# Patient Record
Sex: FEMALE | Race: BLACK OR AFRICAN AMERICAN | NOT HISPANIC OR LATINO | Employment: OTHER | URBAN - METROPOLITAN AREA
[De-identification: names, ages, dates, MRNs, and addresses within clinical notes are randomized per-mention and may not be internally consistent; named-entity substitution may affect disease eponyms.]

---

## 2020-01-29 DIAGNOSIS — M62.830 BACK SPASM: ICD-10-CM

## 2020-01-29 DIAGNOSIS — M54.50 LOW BACK PAIN: Primary | ICD-10-CM

## 2020-01-29 DIAGNOSIS — M51.36 DDD (DEGENERATIVE DISC DISEASE), LUMBAR: ICD-10-CM

## 2020-01-30 ENCOUNTER — CLINICAL SUPPORT (OUTPATIENT)
Dept: REHABILITATION | Facility: HOSPITAL | Age: 70
End: 2020-01-30
Payer: MEDICARE

## 2020-01-30 DIAGNOSIS — M79.605 PAIN IN BOTH LOWER EXTREMITIES: ICD-10-CM

## 2020-01-30 DIAGNOSIS — Z74.09 IMPAIRED FUNCTIONAL MOBILITY AND ACTIVITY TOLERANCE: ICD-10-CM

## 2020-01-30 DIAGNOSIS — G89.29 CHRONIC BILATERAL LOW BACK PAIN WITH BILATERAL SCIATICA: ICD-10-CM

## 2020-01-30 DIAGNOSIS — M54.41 CHRONIC BILATERAL LOW BACK PAIN WITH BILATERAL SCIATICA: ICD-10-CM

## 2020-01-30 DIAGNOSIS — M79.604 PAIN IN BOTH LOWER EXTREMITIES: ICD-10-CM

## 2020-01-30 DIAGNOSIS — M54.42 CHRONIC BILATERAL LOW BACK PAIN WITH BILATERAL SCIATICA: ICD-10-CM

## 2020-01-30 PROCEDURE — 97110 THERAPEUTIC EXERCISES: CPT | Mod: PN

## 2020-01-30 PROCEDURE — 97161 PT EVAL LOW COMPLEX 20 MIN: CPT | Mod: PN

## 2020-01-30 NOTE — PLAN OF CARE
[facility]  Physical Therapy Initial Evaluation    Name: Angelique Colon  Clinic Number: [id number]    Therapy Diagnosis:   Encounter Diagnoses   Name Primary?    Chronic bilateral low back pain with bilateral sciatica     Pain in both lower extremities     Impaired functional mobility and activity tolerance      Physician: Patrick Xiong MD    Physician Orders: PT Eval and Treat   Medical Diagnosis from Referral:   Low back pain   M51.36 (ICD-10-CM) - DDD (degenerative disc disease), lumbar   M62.830 (ICD-10-CM) - Back spasm   Evaluation Date: 1/30/2020  Authorization Period Expiration: 1/28/2021  Plan of Care Expiration: 4/30/2020  Visit # / Visits authorized: 1/ 1    Time In: 10:55a  Time Out: 11:50a  Total Billable Time: 55 minutes    Precautions: Standard    Subjective   Date of onset: 1 year ago, progressively worse   History of current condition - Angelique reports: her lower back is hurting her. She says the doctor told her she has a bulging disc that is pressing on the nerves. She has had two shots that last about 1 week and then the pain comes back with a vengeance. The pain has been off and on for about a year or so. She had a L knee replacement about 3 years ago and feels maybe that is when things started. She stated that one morning this past year she went to get out of bed and she had a pain that went down her L leg. Now the pain is in both legs and it goes all the way down to the ankle. When the pain starts, it stops her from walking and standing and she has to lean on something until the pain goes away. She states the pain is throbbing down her legs and this lasts for about 2 minutes or so. It will ease up to where she can sit down. When she gets in bed at night she has to scoot herself because she can't lift her legs up due to pain. Turning in her sleep wakes her up because of the pain. The pain is in her hips and butt and back of legs which is worse than her back  pain. No numbness or tingling in her legs. Sometimes when she bends over the legs in the back hurt and sometimes when she reaches for something it hurts. Stairs are difficult.      Medical History:   No past medical history on file.   HTN    Surgical History:   Angelique Colon  has no past surgical history on file.   Carpal tunnel   L knee replacement (about 3 years ago)     Medications:   Angelique currently has no medications in their medication list.  Gabapentin     Allergies:   Review of patient's allergies indicates:  Allergies not on file     Imaging, MRI studies: not on file: report from patient: bulging disc in low back     Red Flags:   B&B Changes: none   Sleep dysfunction: yes - after getting comfortable she can fall back asleep - typically sleeps on her R side   Cough&sneezing pain: no    No pain change with positional movements: no   Prior Therapy: not for this   Social History: just moved to 1 story house; lives with their son and lives with a grandchild  Occupation: not currently working   Prior Level of Function: independent   Current Level of Function: if can't  something, she calls her son - modified independent     Pain:  Current 1/10, worst 10/10, best 1/10   Location: bilateral back , buttocks  and legs   Description: Throbbing, Sharp and pulsating  Aggravating Factors: random, can be walking, trying to get in bed, trying to cook   Easing Factors: rest    Pts goals: be able to exercise better - feel better     Objective     Observation: pt has slow gait; pt has a left lateral shift in standing     Posture: increased lumbar lordosis     Lumbar Range of Motion:    Degrees Pain   Flexion 30   Pain and tightness in legs         Extension 0   Increased pain         Left Side Bending 10 Mild         Right Side Bending 5 Extreme pain         Left rotation   Less rotation and less pain than R  Mild         Right Rotation   More rotation than L  Extreme pain            Hip Range of Motion:    Degrees  Pain   Flexion Empty end feel; decreased hip flexion    Pain       Abduction WNL none        Adduction WNL  none        Internal rotation   WNL  Mild increase in pain        External Rotation   WNL bilaterally  None             Lower Extremity Strength  Right LE  Left LE    Knee extension: 5/5 Knee extension: 5/5   Knee flexion: 4+/5 Knee flexion: 5/5   Hip flexion: 4/5 Hip flexion: 4/5   Hip extension:  NT due to pain in prone Hip extension: NT due to pain in prone    Hip abduction: NT due to pain in sidelying  Hip abduction: NT due to pain in sidelying    Hip ER 4/5 Hip ER 4/5   Hip IR 4/5 Hip IR 4/5   Hip adduction: NT due to pain in sidelying  Hip adduction NT due to pain in sidelying    Ankle dorsiflexion: 5/5 Ankle dorsiflexion: 5/5   Ankle plantarflexion: 4-/5 Ankle plantarflexion: 4-/5       Special Tests:  -heel walking: -   -Toe walking: -   -Repeated Flexion: no change in pain   -Repeated Ext: no change in pain   -Piriformis Test: + for pain   -Prone Instability Test: NT  -Bridge Test: unable to perform a bridge due to pain   -Squat: genu valgus, increased pain in butt, throbbing pain in back of thighs   -Slump Test: -  -Quadrant Test: +  -Sign of buttock: -    Functional Tests:   -SLS: 3 sec bilaterally   -Bed mobility: modified independent   -Sit to stand: use of UE, Mod I     DTR:   Right Left   Patellar (L3-4) 2+ 2+   Achilles (S1) 2+ 2+       Neuro Dynamic Testing:    Sciatic nerve:      SLR: R = +     L = +    Joint Mobility: unable to assess due to increased pain; pt unable to lay in prone     Palpation: tenderness to palpation over bilateral hips around glute med, piriformis, hamstrings     Sensation: intact to light touch grossly over BLE     Flexibility: decreased hamstring and QL flexibility    CMS Impairment/Limitation/Restriction for FOTO Lumbar Survey    Therapist reviewed FOTO scores for Angelique Colon on 1/30/2020.   FOTO documents entered into [employer] - see Media section.    Limitation Score:  48%  Category: Mobility    Current : CK = at least 40% but < 60% impaired, limited or restricted  Goal: CK = at least 40% but < 60% impaired, limited or restricted       TREATMENT   Treatment Time In: 11:40a  Treatment Time Out: 11:50a  Total Treatment time separate from Evaluation: 10 minutes    Angelique received therapeutic exercises to develop ROM for 10 minutes including:  z-lying position with ankle pumps x4 min   L hip shift correction against wall x10, 10 sec hold     Home Exercises and Patient Education Provided    Education provided:   - HEP   - Role of PT   - findings of evaluation   - increasing enjoyable activities and walking daily     Written Home Exercises Provided: yes.  Exercises were reviewed and Angelique was able to demonstrate them prior to the end of the session.  Angelique demonstrated good  understanding of the education provided.     See EMR under Patient Instructions for exercises provided 1/30/2020.    Assessment   Angelique is a 69 y.o. female referred to outpatient Physical Therapy with a medical diagnosis of DDD, lumbar and back spasm. Pt presents with a history of low back pain which has progressively worsened over the past year. During the evaluation, pain was a limiting facotr which inhibited PT from including further tests. She presents today with signs and symptoms including bilateral posterior leg pain that is worse than back pain, decreased lumbar ROM, decreased hip flexion, strength and endurance deficits, balance impairments, postural impairments, and decreased tolerance to functional mobility. Pt demonstrates a left lateral hip shift and has improved symptoms of leg pain in left sidelying. She had significant pain with R sidebending and R rotation. She had positive SLR bilaterally and decreased slump test bilaterally. She had positive quadrant test and piriformis test bilaterally and pt unable to perform a bridge in supine due to significant increase in pain. After performing SLR test, pt  had remarkable pain that continued for 5 minutes without decrease, pt instructed to lay on L side with improvements in pain. At this point the evaluation was ended and pt was given an HEP to continue until follow up appointment. Due to limitations noted, pt is unable to fully participate in daily activities and social activities and she has difficulty with sleeping. Pt is appropriate for physical therapy and would benefit from decreasing neural tension, mobility exercises, and improving strength and tolerance to functional activities.     Pt prognosis is Good.   Pt will benefit from skilled outpatient Physical Therapy to address the deficits stated above and in the chart below, provide pt/family education, and to maximize pt's level of independence.     Plan of care discussed with patient: Yes  Pt's spiritual, cultural and educational needs considered and patient is agreeable to the plan of care and goals as stated below:     Anticipated Barriers for therapy: none    Medical Necessity is demonstrated by the following  History  Co-morbidities and personal factors that may impact the plan of care Co-morbidities:   HTN    Personal Factors:   no deficits     low   Examination  Body Structures and Functions, activity limitations and participation restrictions that may impact the plan of care Body Regions:   back  lower extremities  trunk    Body Systems:    gross symmetry  ROM  strength  gross coordinated movement  balance  gait  transfers  transitions  motor control  motor learning    Participation Restrictions:   Moderate     Activity limitations:   Learning and applying knowledge  no deficits    General Tasks and Commands  no deficits    Communication  no deficits    Mobility  lifting and carrying objects  walking  driving (bike, car, motorcycle)    Self care  washing oneself (bathing, drying, washing hands)  caring for body parts (brushing teeth, shaving, grooming)  toileting    Domestic  Life  shopping  cooking  doing house work (cleaning house, washing dishes, laundry)  assisting others    Interactions/Relationships  no deficits    Life Areas  no deficits    Community and Social Life  community life  recreation and leisure         moderate   Clinical Presentation stable and uncomplicated low   Decision Making/ Complexity Score: low     Goals:  Short Term Goals: 4 weeks      1. Pt will be independent with HEP in order to demonstrate self management.   2. Pt will report a decrease in pain at its worse to 7/10 in order to improve quality of life.   3. Pt will increase lumbar flexion, extension, sidebending ROM by 10 degrees to decrease pain and increase functional mobility.   4. Pt will increase BLE MMT by 1/3 in order to demonstrate increased strength.   5. Pt will be able to tolerate walking several blocks with no difficulty in order to improve community mobility and participate in family activities.   6. Pt will demonstrate appropriate body mechanics independently to prevent injury.      Long Term Goals: 8 weeks      1. Pt will be independent with updated HEP in order to demonstrate self management.   2. Pt will report a decrease in pain at its worse to 4/10 in order to improve quality of life.   3. Pt will increase lumbar ROM to WFL with decreased pain in all directions to improve gait and functional mobility.   4. Pt will increase BLE MMT to 4+/5 or greater to improve strength and endurance to perform daily activities.   5. Pt will have a FOTO limitation score of < or = to 40% to demonstrate improved functional mobility.   6. Pt will be able to perform daily activities including moderately heavy activities around the house with no difficulty and minimal modifications to increase independence.     Plan   Plan of care Certification: 1/30/2020 to 4/30/2020.    Outpatient Physical Therapy 1-2 times weekly for 12  weeks to include the following interventions: Cervical/Lumbar Traction, Manual Therapy,  Moist Heat/ Ice, Neuromuscular Re-ed, Patient Education, Self Care, Therapeutic Activites and Therapeutic Exercise.     Maria Esther Singh, PT  01/30/2020    Thank you for your referral!     I have seen the patient, reviewed the therapist's plan of care, and I agree with the plan of care.      I certify the need for these services furnished under this plan of treatment and while under my care.      ___________________ ________ Physician/Referring Practitioner             ___________________________ Date of Signature

## 2020-02-06 ENCOUNTER — CLINICAL SUPPORT (OUTPATIENT)
Dept: REHABILITATION | Facility: HOSPITAL | Age: 70
End: 2020-02-06
Payer: MEDICARE

## 2020-02-06 DIAGNOSIS — M54.41 CHRONIC BILATERAL LOW BACK PAIN WITH BILATERAL SCIATICA: ICD-10-CM

## 2020-02-06 DIAGNOSIS — G89.29 CHRONIC BILATERAL LOW BACK PAIN WITH BILATERAL SCIATICA: ICD-10-CM

## 2020-02-06 DIAGNOSIS — M79.604 PAIN IN BOTH LOWER EXTREMITIES: ICD-10-CM

## 2020-02-06 DIAGNOSIS — M54.42 CHRONIC BILATERAL LOW BACK PAIN WITH BILATERAL SCIATICA: ICD-10-CM

## 2020-02-06 DIAGNOSIS — Z74.09 IMPAIRED FUNCTIONAL MOBILITY AND ACTIVITY TOLERANCE: ICD-10-CM

## 2020-02-06 DIAGNOSIS — M79.605 PAIN IN BOTH LOWER EXTREMITIES: ICD-10-CM

## 2020-02-06 PROCEDURE — 97110 THERAPEUTIC EXERCISES: CPT | Mod: PN,CQ

## 2020-02-06 NOTE — PROGRESS NOTES
"  Physical Therapy Daily Treatment Note     Name: Angelique Colon  Clinic Number: [id number]    Therapy Diagnosis:   Encounter Diagnoses   Name Primary?    Chronic bilateral low back pain with bilateral sciatica     Pain in both lower extremities     Impaired functional mobility and activity tolerance      Physician: Patrick Xiong MD    Visit Date: 2/6/2020    Physician Orders: PT Eval and Treat   Medical Diagnosis from Referral:   Low back pain   M51.36 (ICD-10-CM) - DDD (degenerative disc disease), lumbar   M62.830 (ICD-10-CM) - Back spasm   Evaluation Date: 1/30/2020  Authorization Period Expiration: 1/28/2021  Plan of Care Expiration: 4/30/2020  Visit # / Visits authorized: 2/ 6     Time In: 1407  Time Out: 1504  Total Billable Time: 57 minutes     Precautions: Standard      Subjective     Pt reports: being in a lot of pain almost all of the time.  .  She was compliant with home exercise program.  Response to previous treatment: no change  Functional change: ongoing    Pain:  Current 6/10, worst 10/10, best 1/10   Location: bilateral back , buttocks  and legs   Description: Throbbing, Sharp and pulsating  Aggravating Factors: random, can be walking, trying to get in bed, trying to cook   Easing Factors: rest     Pts goals: be able to exercise better - feel better     Objective     Angelique received therapeutic exercises to develop strength for 55 minutes including:    PB Rollouts 1x10  Pelvic Tilts 1x3'  Abdominal Bracing 1x3 3" Hold  Supine Hip Add Ball/1x3'  Supine Hip Abd Belt/1x3'  Seated Marching 1x30  HS Stretch Seated 3x1'  Piriformis Stretch Supine 2x1'    Home Exercises Provided and Patient Education Provided     Education provided:   Cont to perform HEP as provided.     Written Home Exercises Provided: Patient instructed to cont prior HEP.  Exercises were reviewed and Angelique was able to demonstrate them prior to the end of the session.  Angelique demonstrated fair  understanding of the education " provided.     See EMR under Patient Instructions for exercises provided prior visit.    Assessment     Pt presents with significant pain with notable antalgic gait 2* LB pain that shoots all the way to her feet causing shoot pain.  Pt unable to tolerate TherEx initially.  Initiated tx with Ball Rollouts for spinal mobility.  Transitioned to pelvic tilts which were tolerated.  Educated pt on Abdominal Bracing and transitioned to TrA activation and stabilization with Hip Ab and Hip Add for reduced symptoms.  Incorporated seated matching with carryover of TrA activation for household activities.  Pt tolerated treatment today without any increase or decrease in symptoms.  Will progress dynamic activities next visit.    Angelique is progressing well towards her goals.   Pt prognosis is Fair.     Pt will continue to benefit from skilled outpatient physical therapy to address the deficits listed in the problem list box on initial evaluation, provide pt/family education and to maximize pt's level of independence in the home and community environment.     Pt's spiritual, cultural and educational needs considered and pt agreeable to plan of care and goals.    Anticipated Barriers for therapy: none       Goals:   Short Term Goals: 4 weeks      1. Pt will be independent with HEP in order to demonstrate self management.   2. Pt will report a decrease in pain at its worse to 7/10 in order to improve quality of life.   3. Pt will increase lumbar flexion, extension, sidebending ROM by 10 degrees to decrease pain and increase functional mobility.   4. Pt will increase BLE MMT by 1/3 in order to demonstrate increased strength.   5. Pt will be able to tolerate walking several blocks with no difficulty in order to improve community mobility and participate in family activities.   6. Pt will demonstrate appropriate body mechanics independently to prevent injury.      Long Term Goals: 8 weeks      1. Pt will be independent with updated HEP  in order to demonstrate self management.   2. Pt will report a decrease in pain at its worse to 4/10 in order to improve quality of life.   3. Pt will increase lumbar ROM to WFL with decreased pain in all directions to improve gait and functional mobility.   4. Pt will increase BLE MMT to 4+/5 or greater to improve strength and endurance to perform daily activities.   5. Pt will have a FOTO limitation score of < or = to 40% to demonstrate improved functional mobility.   6. Pt will be able to perform daily activities including moderately heavy activities around the house with no difficulty and minimal modifications to increase independence.       Plan     Continue per POC for improved mobility and reduced symptoms in LB.      Plan of care Certification: 1/30/2020 to 4/30/2020.       Cont skilled PT session towards PT and patient's goals.    Gio Estrada, AMIRA   02/06/2020

## 2020-02-10 ENCOUNTER — CLINICAL SUPPORT (OUTPATIENT)
Dept: REHABILITATION | Facility: HOSPITAL | Age: 70
End: 2020-02-10
Payer: MEDICARE

## 2020-02-10 DIAGNOSIS — G89.29 CHRONIC BILATERAL LOW BACK PAIN WITH BILATERAL SCIATICA: ICD-10-CM

## 2020-02-10 DIAGNOSIS — M79.605 PAIN IN BOTH LOWER EXTREMITIES: ICD-10-CM

## 2020-02-10 DIAGNOSIS — M79.604 PAIN IN BOTH LOWER EXTREMITIES: ICD-10-CM

## 2020-02-10 DIAGNOSIS — M54.42 CHRONIC BILATERAL LOW BACK PAIN WITH BILATERAL SCIATICA: ICD-10-CM

## 2020-02-10 DIAGNOSIS — M54.41 CHRONIC BILATERAL LOW BACK PAIN WITH BILATERAL SCIATICA: ICD-10-CM

## 2020-02-10 DIAGNOSIS — Z74.09 IMPAIRED FUNCTIONAL MOBILITY AND ACTIVITY TOLERANCE: ICD-10-CM

## 2020-02-10 PROCEDURE — 97110 THERAPEUTIC EXERCISES: CPT | Mod: PN,CQ

## 2020-02-10 NOTE — PROGRESS NOTES
"  Physical Therapy Daily Treatment Note     Name: Angelique Colon  Clinic Number: [id number]    Therapy Diagnosis:   Encounter Diagnoses   Name Primary?    Chronic bilateral low back pain with bilateral sciatica     Pain in both lower extremities     Impaired functional mobility and activity tolerance      Physician: Patrick Xiong MD    Visit Date: 2/10/2020    Physician Orders: PT Eval and Treat   Medical Diagnosis from Referral:   Low back pain   M51.36 (ICD-10-CM) - DDD (degenerative disc disease), lumbar   M62.830 (ICD-10-CM) - Back spasm   Evaluation Date: 1/30/2020  Authorization Period Expiration: 1/28/2021  Plan of Care Expiration: 4/30/2020  Visit # / Visits authorized: 3/ 6     Time In: 0703  Time Out: 0800  Total Billable Time: 57 minutes     Precautions: Standard      Subjective     Pt reports: feeling better over the weekend.  Able to help pack up her house for moving over the weekend both Sat and Sun.   She was compliant with home exercise program.  Response to previous treatment: no change  Functional change: ongoing    Pain:  Current 4/10, worst 10/10, best 1/10   Location: bilateral back , buttocks  and legs   Description: Throbbing, Sharp and pulsating  Aggravating Factors: random, can be walking, trying to get in bed, trying to cook   Easing Factors: rest     Pts goals: be able to exercise better - feel better     Objective     Angelique received therapeutic exercises to develop strength for 57 minutes including:    NuStep 5'  PB Rollouts 1x10  Pelvic Tilts 1x3'  LTR 1x3'  Abdominal Bracing 1x3 3" Hold  Supine Hip Add Ball/1x3'  Supine Hip Abd Belt/1x3'  Seated Marching 1x30  HS Stretch Seated 3x1'  Piriformis Stretch Supine 2x1'  Standing Punchout YTB/1x30  Standing Trunk Rotations YTB/1x30    Home Exercises Provided and Patient Education Provided     Education provided:   Cont to perform HEP as provided.     Written Home Exercises Provided: Patient instructed to cont prior HEP.  Exercises " were reviewed and Angelique was able to demonstrate them prior to the end of the session.  Angelique demonstrated fair  understanding of the education provided.     See EMR under Patient Instructions for exercises provided prior visit.    Assessment     Pt presents with reduced pain with notable antalgic gait 2* LB pain that, at times, shoots all the way to her feet causing shoot pain.  Initiated tx with Ball Rollouts for spinal mobility.  Transitioned to pelvic tilts.  Educated pt on Abdominal Bracing and transitioned to TrA activation and stabilization with Hip Ab and Hip Add for reduced symptoms.  Incorporated seated matching with carryover of TrA activation for household activities.  Challenged TrA with Standing Punchouts for carryover to household activities.  Pt tolerated treatment today without any increase or decrease in symptoms.    Angelique is progressing well towards her goals.   Pt prognosis is Fair.     Pt will continue to benefit from skilled outpatient physical therapy to address the deficits listed in the problem list box on initial evaluation, provide pt/family education and to maximize pt's level of independence in the home and community environment.     Pt's spiritual, cultural and educational needs considered and pt agreeable to plan of care and goals.    Anticipated Barriers for therapy: none       Goals:   Short Term Goals: 4 weeks      1. Pt will be independent with HEP in order to demonstrate self management.   2. Pt will report a decrease in pain at its worse to 7/10 in order to improve quality of life.   3. Pt will increase lumbar flexion, extension, sidebending ROM by 10 degrees to decrease pain and increase functional mobility.   4. Pt will increase BLE MMT by 1/3 in order to demonstrate increased strength.   5. Pt will be able to tolerate walking several blocks with no difficulty in order to improve community mobility and participate in family activities.   6. Pt will demonstrate appropriate body  mechanics independently to prevent injury.      Long Term Goals: 8 weeks      1. Pt will be independent with updated HEP in order to demonstrate self management.   2. Pt will report a decrease in pain at its worse to 4/10 in order to improve quality of life.   3. Pt will increase lumbar ROM to WFL with decreased pain in all directions to improve gait and functional mobility.   4. Pt will increase BLE MMT to 4+/5 or greater to improve strength and endurance to perform daily activities.   5. Pt will have a FOTO limitation score of < or = to 40% to demonstrate improved functional mobility.   6. Pt will be able to perform daily activities including moderately heavy activities around the house with no difficulty and minimal modifications to increase independence.       Plan     Continue per POC for improved mobility and reduced symptoms in LB.      Plan of care Certification: 1/30/2020 to 4/30/2020.       Cont skilled PT session towards PT and patient's goals.    Gio Estrada, PTA   02/10/2020

## 2020-02-13 ENCOUNTER — CLINICAL SUPPORT (OUTPATIENT)
Dept: REHABILITATION | Facility: HOSPITAL | Age: 70
End: 2020-02-13
Payer: MEDICARE

## 2020-02-13 DIAGNOSIS — Z74.09 IMPAIRED FUNCTIONAL MOBILITY AND ACTIVITY TOLERANCE: ICD-10-CM

## 2020-02-13 DIAGNOSIS — M79.605 PAIN IN BOTH LOWER EXTREMITIES: ICD-10-CM

## 2020-02-13 DIAGNOSIS — M54.42 CHRONIC BILATERAL LOW BACK PAIN WITH BILATERAL SCIATICA: ICD-10-CM

## 2020-02-13 DIAGNOSIS — M79.604 PAIN IN BOTH LOWER EXTREMITIES: ICD-10-CM

## 2020-02-13 DIAGNOSIS — G89.29 CHRONIC BILATERAL LOW BACK PAIN WITH BILATERAL SCIATICA: ICD-10-CM

## 2020-02-13 DIAGNOSIS — M54.41 CHRONIC BILATERAL LOW BACK PAIN WITH BILATERAL SCIATICA: ICD-10-CM

## 2020-02-13 PROCEDURE — 97110 THERAPEUTIC EXERCISES: CPT | Mod: PN

## 2020-02-13 NOTE — PROGRESS NOTES
Physical Therapy Daily Treatment Note     Name: Angelique Colon  Clinic Number: [id number]    Therapy Diagnosis:   Encounter Diagnoses   Name Primary?    Chronic bilateral low back pain with bilateral sciatica     Pain in both lower extremities     Impaired functional mobility and activity tolerance      Physician: Patrick Xiong MD    Visit Date: 2/13/2020    Physician Orders: PT Eval and Treat   Medical Diagnosis from Referral:   Low back pain   M51.36 (ICD-10-CM) - DDD (degenerative disc disease), lumbar   M62.830 (ICD-10-CM) - Back spasm   Evaluation Date: 1/30/2020  Authorization Period Expiration: 1/28/2021  Plan of Care Expiration: 4/30/2020  Visit # / Visits authorized: 4/ 6     Time In: 2:53p  Time Out: 3:55p  Total Billable Time: 30 minutes     Precautions: Standard    Subjective     Pt reports: she thinks therapy is helping, she still has pain though that comes and goes randomly.   She was compliant with home exercise program.  Response to previous treatment: no change  Functional change: ongoing    Pain:  Current 5/10  Location: bilateral back , buttocks  and legs      Pts goals: be able to exercise better - feel better     Objective     Angelique received therapeutic exercises to develop strength for 62 minutes including:    NuStep 5'  PB Rollouts 1x15  Pelvic Tilts 1x3'  LTR 1x3'  Supine Hip Add Ball/1x3'  Supine Hip Abd Belt/1x3'  +Supine TA set with marching 2x1 min   +R sidelying over pillow roll with arm reaches x2 min   Seated Marching 1x30  HS Stretch Seated 3x1'  Piriformis Stretch Supine 2x1'  Standing Punchout YTB/1x30  Standing Trunk Rotations YTB/1x30  +Standing marching x30    Home Exercises Provided and Patient Education Provided     Education provided:   Cont to perform HEP as provided.     Written Home Exercises Provided: Patient instructed to cont prior HEP.  Exercises were reviewed and Angelique was able to demonstrate them prior to the end of the session.  Angelique demonstrated fair   understanding of the education provided.     See EMR under Patient Instructions for exercises provided prior visit.    Assessment     Pt tolerated therapy session well today. She only had significant increase in pain with her first round of piriformis stretch which caused increased pain to her calf on her R side. She was challenged with new exercises today and tolerated them well. She would benefit from continued trunk mobility and core strengthening exercises to improve functional mobility.    Angelique is progressing well towards her goals.   Pt prognosis is Fair.     Pt will continue to benefit from skilled outpatient physical therapy to address the deficits listed in the problem list box on initial evaluation, provide pt/family education and to maximize pt's level of independence in the home and community environment.   Pt's spiritual, cultural and educational needs considered and pt agreeable to plan of care and goals.    Anticipated Barriers for therapy: none     Goals:   Short Term Goals: 4 weeks - ongoing      1. Pt will be independent with HEP in order to demonstrate self management.   2. Pt will report a decrease in pain at its worse to 7/10 in order to improve quality of life.   3. Pt will increase lumbar flexion, extension, sidebending ROM by 10 degrees to decrease pain and increase functional mobility.   4. Pt will increase BLE MMT by 1/3 in order to demonstrate increased strength.   5. Pt will be able to tolerate walking several blocks with no difficulty in order to improve community mobility and participate in family activities.   6. Pt will demonstrate appropriate body mechanics independently to prevent injury.      Long Term Goals: 8 weeks - ongoing      1. Pt will be independent with updated HEP in order to demonstrate self management.   2. Pt will report a decrease in pain at its worse to 4/10 in order to improve quality of life.   3. Pt will increase lumbar ROM to WFL with decreased pain in all  directions to improve gait and functional mobility.   4. Pt will increase BLE MMT to 4+/5 or greater to improve strength and endurance to perform daily activities.   5. Pt will have a FOTO limitation score of < or = to 40% to demonstrate improved functional mobility.   6. Pt will be able to perform daily activities including moderately heavy activities around the house with no difficulty and minimal modifications to increase independence.       Plan     Continue per POC for improved mobility and reduced symptoms in LB.      Plan of care Certification: 1/30/2020 to 4/30/2020.     Maria Esther Singh, PT   02/13/2020

## 2020-02-18 ENCOUNTER — CLINICAL SUPPORT (OUTPATIENT)
Dept: REHABILITATION | Facility: HOSPITAL | Age: 70
End: 2020-02-18
Payer: MEDICARE

## 2020-02-18 DIAGNOSIS — G89.29 CHRONIC BILATERAL LOW BACK PAIN WITH BILATERAL SCIATICA: ICD-10-CM

## 2020-02-18 DIAGNOSIS — M79.604 PAIN IN BOTH LOWER EXTREMITIES: ICD-10-CM

## 2020-02-18 DIAGNOSIS — M79.605 PAIN IN BOTH LOWER EXTREMITIES: ICD-10-CM

## 2020-02-18 DIAGNOSIS — M54.42 CHRONIC BILATERAL LOW BACK PAIN WITH BILATERAL SCIATICA: ICD-10-CM

## 2020-02-18 DIAGNOSIS — M54.41 CHRONIC BILATERAL LOW BACK PAIN WITH BILATERAL SCIATICA: ICD-10-CM

## 2020-02-18 DIAGNOSIS — Z74.09 IMPAIRED FUNCTIONAL MOBILITY AND ACTIVITY TOLERANCE: ICD-10-CM

## 2020-02-18 PROCEDURE — 97110 THERAPEUTIC EXERCISES: CPT | Mod: PN

## 2020-02-18 NOTE — PROGRESS NOTES
"  Physical Therapy Daily Treatment Note     Name: Angelique Colon  Clinic Number: [id number]    Therapy Diagnosis:   Encounter Diagnoses   Name Primary?    Chronic bilateral low back pain with bilateral sciatica     Pain in both lower extremities     Impaired functional mobility and activity tolerance      Physician: Patrick Xiong MD    Visit Date: 2/18/2020    Physician Orders: PT Eval and Treat   Medical Diagnosis from Referral:   Low back pain   M51.36 (ICD-10-CM) - DDD (degenerative disc disease), lumbar   M62.830 (ICD-10-CM) - Back spasm   Evaluation Date: 1/30/2020   PN due: 2/30/2020  Authorization Period Expiration: 3/18/2020  Plan of Care Expiration: 4/30/2020  Visit # / Visits authorized: 1/12 (5 total)      Time In: 9:10a  Time Out: 10:00a  Total Billable Time: 27 minutes     Precautions: Standard    Subjective     Pt reports: "I'm going to say I feel great, but I don't"   She was compliant with home exercise program.  Response to previous treatment: no change  Functional change: ongoing    Pain:  Current 8/10  Location: bilateral back , buttocks  and legs      Pts goals: be able to exercise better - feel better     Objective     Angelique received therapeutic exercises to develop strength for  minutes including:    NuStep 5'  PB Rollouts (W)  1x8  Pelvic Tilts 1x3'  LTR 1x3'  Supine Hip Add Ball/1x3'  Supine Hip Abd Belt/1x3'  Supine TA set with marching 2x1 min   R sidelying over pillow roll with arm reaches x2 min   Seated Marching 1x30  HS Stretch Seated 3x1'  Piriformis Stretch Supine 2x1'  Standing Punchout YTB/1x30  Standing Trunk Rotations YTB/1x30  Standing marching x30  +Doorway side gliding away from R side x10   +QL stretch in doorway towards R side x5     Home Exercises Provided and Patient Education Provided     Education provided:   Cont to perform HEP as provided.     Written Home Exercises Provided: Patient instructed to cont prior HEP.  Exercises were reviewed and Angelique was able to " demonstrate them prior to the end of the session.  Angelique demonstrated fair  understanding of the education provided.     See EMR under Patient Instructions for exercises provided prior visit.    Assessment     Pt tolerated therapy session well today. Pt challenged with further coronal plane exercises to improve R hip shift and lower R back pain. These new exercises were very challenging to her but educated to continue to perform these at home every other hour to continue to tolerate them better and progress in therapy. Pt would continue to benefit from focusing on hip shift and improving core strength in new ranges. Continue with POC and progress as able.   Angelique is progressing well towards her goals.   Pt prognosis is Fair.     Pt will continue to benefit from skilled outpatient physical therapy to address the deficits listed in the problem list box on initial evaluation, provide pt/family education and to maximize pt's level of independence in the home and community environment.   Pt's spiritual, cultural and educational needs considered and pt agreeable to plan of care and goals.    Anticipated Barriers for therapy: none     Goals:   Short Term Goals: 4 weeks - ongoing      1. Pt will be independent with HEP in order to demonstrate self management.   2. Pt will report a decrease in pain at its worse to 7/10 in order to improve quality of life.   3. Pt will increase lumbar flexion, extension, sidebending ROM by 10 degrees to decrease pain and increase functional mobility.   4. Pt will increase BLE MMT by 1/3 in order to demonstrate increased strength.   5. Pt will be able to tolerate walking several blocks with no difficulty in order to improve community mobility and participate in family activities.   6. Pt will demonstrate appropriate body mechanics independently to prevent injury.      Long Term Goals: 8 weeks - ongoing      1. Pt will be independent with updated HEP in order to demonstrate self management.    2. Pt will report a decrease in pain at its worse to 4/10 in order to improve quality of life.   3. Pt will increase lumbar ROM to WFL with decreased pain in all directions to improve gait and functional mobility.   4. Pt will increase BLE MMT to 4+/5 or greater to improve strength and endurance to perform daily activities.   5. Pt will have a FOTO limitation score of < or = to 40% to demonstrate improved functional mobility.   6. Pt will be able to perform daily activities including moderately heavy activities around the house with no difficulty and minimal modifications to increase independence.       Plan     Continue per POC for improved mobility and reduced symptoms in LB.      Plan of care Certification: 1/30/2020 to 4/30/2020.     Maria Esther Singh, PT   02/18/2020

## 2020-02-21 ENCOUNTER — CLINICAL SUPPORT (OUTPATIENT)
Dept: REHABILITATION | Facility: HOSPITAL | Age: 70
End: 2020-02-21
Payer: MEDICARE

## 2020-02-21 DIAGNOSIS — Z74.09 IMPAIRED FUNCTIONAL MOBILITY AND ACTIVITY TOLERANCE: ICD-10-CM

## 2020-02-21 DIAGNOSIS — M79.604 PAIN IN BOTH LOWER EXTREMITIES: ICD-10-CM

## 2020-02-21 DIAGNOSIS — G89.29 CHRONIC BILATERAL LOW BACK PAIN WITH BILATERAL SCIATICA: ICD-10-CM

## 2020-02-21 DIAGNOSIS — M54.41 CHRONIC BILATERAL LOW BACK PAIN WITH BILATERAL SCIATICA: ICD-10-CM

## 2020-02-21 DIAGNOSIS — M79.605 PAIN IN BOTH LOWER EXTREMITIES: ICD-10-CM

## 2020-02-21 DIAGNOSIS — M54.42 CHRONIC BILATERAL LOW BACK PAIN WITH BILATERAL SCIATICA: ICD-10-CM

## 2020-02-21 PROCEDURE — 97110 THERAPEUTIC EXERCISES: CPT | Mod: PN

## 2020-02-21 NOTE — PROGRESS NOTES
Physical Therapy Daily Treatment Note     Name: Angelique Colon  Clinic Number: [id number]    Therapy Diagnosis:   Encounter Diagnoses   Name Primary?    Chronic bilateral low back pain with bilateral sciatica     Pain in both lower extremities     Impaired functional mobility and activity tolerance      Physician: Patrick Xiong MD    Visit Date: 2/21/2020    Physician Orders: PT Eval and Treat   Medical Diagnosis from Referral:   Low back pain   M51.36 (ICD-10-CM) - DDD (degenerative disc disease), lumbar   M62.830 (ICD-10-CM) - Back spasm   Evaluation Date: 1/30/2020   PN due: 2/30/2020  Authorization Period Expiration: 3/18/2020  Plan of Care Expiration: 4/30/2020  Visit # / Visits authorized: 2/12 (6 total)      Time In: 8:01a  Time Out: 8:50a  Total Billable Time: 49 minutes     Precautions: Standard    Subjective     Pt reports: she feels okay and she felt okay after last session. No new complaints. Pt reports she has to leave 10 minutes early today.   She was compliant with home exercise program.  Response to previous treatment: no change  Functional change: ongoing    Pain:  Current 6/10  Location: bilateral back , buttocks  and legs      Pts goals: be able to exercise better - feel better     Objective     Angelique received therapeutic exercises to develop strength for 49 minutes including:    NuStep 6'  Gastroc stretch [id number]   PB Rollouts (W)  1x8  +Sciatic nerve glides [id number]   Doorway side gliding away from R side x10   QL stretch in doorway towards R side x5   Piriformis Stretch Supine [id number]  Pelvic Tilts 1x2'  LTR 1x2'  Supine Hip Add Ball squeeze x2 min   Supine Hip Abd, RTB x2 min, 5 sec hold   Supine TA set with marching 2x1 min   R sidelying over pillow roll with arm reaches x2 min   +Prone hip IR/ER AROM x20 ea   HS Stretch on stairs [id number]   Standing Punchout YTB/1x30  Standing Trunk Rotations YTB/1x30  Standing marching x30    Home Exercises Provided and Patient Education Provided  "    Education provided:   Cont to perform HEP as provided.     Written Home Exercises Provided: Patient instructed to cont prior HEP.  Exercises were reviewed and Angelique was able to demonstrate them prior to the end of the session.  Angelique demonstrated fair  understanding of the education provided.     See EMR under Patient Instructions for exercises provided prior visit.    Assessment     Pt tolerated therapy session well today. Pt continues with thigh and back pain that is "unrelenting". She has no change in this pain except for an increase in discomfort with hip side glides in the doorway. Pt given updated HEP to continue with self-management. Pt educated on ways to keep mobile during [place] Gr[place] season to not exacerbate her pain.  Continue with POC and progress as able.   Angelique is progressing well towards her goals.   Pt prognosis is Fair.     Pt will continue to benefit from skilled outpatient physical therapy to address the deficits listed in the problem list box on initial evaluation, provide pt/family education and to maximize pt's level of independence in the home and community environment.   Pt's spiritual, cultural and educational needs considered and pt agreeable to plan of care and goals.    Anticipated Barriers for therapy: none     Goals:   Short Term Goals: 4 weeks - ongoing      1. Pt will be independent with HEP in order to demonstrate self management.   2. Pt will report a decrease in pain at its worse to 7/10 in order to improve quality of life.   3. Pt will increase lumbar flexion, extension, sidebending ROM by 10 degrees to decrease pain and increase functional mobility.   4. Pt will increase BLE MMT by 1/3 in order to demonstrate increased strength.   5. Pt will be able to tolerate walking several blocks with no difficulty in order to improve community mobility and participate in family activities.   6. Pt will demonstrate appropriate body mechanics independently to prevent injury.      Long Term " Goals: 8 weeks - ongoing      1. Pt will be independent with updated HEP in order to demonstrate self management.   2. Pt will report a decrease in pain at its worse to 4/10 in order to improve quality of life.   3. Pt will increase lumbar ROM to WFL with decreased pain in all directions to improve gait and functional mobility.   4. Pt will increase BLE MMT to 4+/5 or greater to improve strength and endurance to perform daily activities.   5. Pt will have a FOTO limitation score of < or = to 40% to demonstrate improved functional mobility.   6. Pt will be able to perform daily activities including moderately heavy activities around the house with no difficulty and minimal modifications to increase independence.       Plan     Continue per POC for improved mobility and reduced symptoms in LB.      Plan of care Certification: 1/30/2020 to 4/30/2020.     Maria Esther Singh, PT   02/21/2020

## 2020-03-10 ENCOUNTER — CLINICAL SUPPORT (OUTPATIENT)
Dept: REHABILITATION | Facility: HOSPITAL | Age: 70
End: 2020-03-10
Payer: MEDICARE

## 2020-03-10 DIAGNOSIS — M54.41 CHRONIC BILATERAL LOW BACK PAIN WITH BILATERAL SCIATICA: ICD-10-CM

## 2020-03-10 DIAGNOSIS — G89.29 CHRONIC BILATERAL LOW BACK PAIN WITH BILATERAL SCIATICA: ICD-10-CM

## 2020-03-10 DIAGNOSIS — M54.42 CHRONIC BILATERAL LOW BACK PAIN WITH BILATERAL SCIATICA: ICD-10-CM

## 2020-03-10 DIAGNOSIS — M79.604 PAIN IN BOTH LOWER EXTREMITIES: ICD-10-CM

## 2020-03-10 DIAGNOSIS — M79.605 PAIN IN BOTH LOWER EXTREMITIES: ICD-10-CM

## 2020-03-10 DIAGNOSIS — Z74.09 IMPAIRED FUNCTIONAL MOBILITY AND ACTIVITY TOLERANCE: ICD-10-CM

## 2020-03-10 PROCEDURE — 97110 THERAPEUTIC EXERCISES: CPT | Mod: PN

## 2020-03-10 NOTE — PROGRESS NOTES
Physical Therapy Daily Treatment Note     Name: Angelique Colon  [facility] Number: [id number]    Therapy Diagnosis:   Encounter Diagnoses   Name Primary?    Chronic bilateral low back pain with bilateral sciatica     Pain in both lower extremities     Impaired functional mobility and activity tolerance      Physician: Patrick Xiong MD    Visit Date: 3/10/2020    Physician Orders: PT Eval and Treat   Medical Diagnosis from Referral:   Low back pain   M51.36 (ICD-10-CM) - DDD (degenerative disc disease), lumbar   M62.830 (ICD-10-CM) - Back spasm   Evaluation Date: 1/30/2020   PN due: 2/30/2020  Authorization Period Expiration: 3/18/2020  Plan of Care Expiration: 4/30/2020  Visit # / Visits authorized: 3/12 (7 total)      Time In: 10:56a  Time Out: 11:58a  Total Billable Time: 34 minutes     Precautions: Standard    Subjective     Pt reports: she fell the other week and hit her head because she blacked out. She had to get staples in her head and is getting them removed this week. She states she hasn't had that before or since. She feels okay to perform activity today.   She was compliant with home exercise program.  Response to previous treatment: no change  Functional change: ongoing    Pain:  Current 6/10  Location: bilateral back , buttocks  and legs      Pts goals: be able to exercise better - feel better     Objective     Lumbar Range of Motion:     Degrees Pain   Flexion 50    Pain and tightness in legs          Extension 5    Increased pain          Left Side Bending 15 Mild          Right Side Bending 5 Extreme pain          Left rotation    WFL  Mild          Right Rotation    25% limited   Extreme pain             Hip Range of Motion:     Degrees Pain   Flexion WNL    None        Abduction WNL none         Adduction WNL  none         Internal rotation    WNL  Mild increase in pain         External Rotation    WNL bilaterally  None             Lower Extremity Strength  Right LE   Left LE     Knee  extension: 5/5 Knee extension: 5/5   Knee flexion: 5/5 Knee flexion: 5/5   Hip flexion: 4+/5 Hip flexion: 4+/5   Hip extension:  3/5 Hip extension: 3/5   Hip abduction: 3/5 Hip abduction: 3+/5   Hip ER 4+/5 Hip ER 4+/5   Hip IR 4+/5 Hip IR 4+/5   Hip adduction: NT due to pain in sidelying  Hip adduction NT due to pain in sidelying    Ankle dorsiflexion: 5/5 Ankle dorsiflexion: 5/5   Ankle plantarflexion: 4-/5 Ankle plantarflexion: 4-/5     Angelique received therapeutic exercises to develop strength for 62 minutes including:    NuStep 6'  Gastroc stretch [id number]   PB Rollouts (W)  1x8  Seated sciatic nerve glides 2x10   Seated long sitting hamstring stretch [id number] ea   Standing hamstring stretch on stairs [id number]   Doorway side gliding away from R side x10   QL stretch in doorway towards R side x5   Piriformis Stretch Supine [id number]  Open books x15 ea   Pelvic Tilts 1x2'  LTR 1x2'  SKTC x10, 10 sec hold   Supine Hip Add Ball squeeze x2 min   Supine Hip Abd, RTB x2 min, 5 sec hold   Supine TA set with marching 2x1 min   R sidelying over pillow roll with arm reaches x2 min   Prone hip IR/ER AROM x20 ea   Standing Punchout YTB/1x30  Standing Trunk Rotations YTB/1x30  Standing marching x30    Home Exercises Provided and Patient Education Provided     Education provided:   Cont to perform HEP as provided.     Written Home Exercises Provided: Patient instructed to cont prior HEP.  Exercises were reviewed and Angelique was able to demonstrate them prior to the end of the session.  Angelique demonstrated fair  understanding of the education provided.     See EMR under Patient Instructions for exercises provided prior visit.    Assessment     Pt tolerated therapy session well today. A progress note was performed today. She has met 1 goal so far and is progressing slowly towards her other goals. She continues with significant back and posterior leg pain that is exacerbated with most isolated lumbar movements including flexion and  extension. She is unable to perform repetitive lumbar movements. She was challenged with new there-ex today to improve neural tension and improve lumbar mobility in all directions. She has decreased tolerance to functional mobility, and requires rest breaks during house cleaning and walking. Pt has improved her BLE strength, but continues with decreased hip and lumbar musculature endurance. Pt is appropriate to continue with physical therapy and would benefit from further functional mobility training in order to return to prior level of function. Pt was given an updated HEP in order to improve with self-management.   Angelique is progressing well towards her goals.   Pt prognosis is Fair.     Pt will continue to benefit from skilled outpatient physical therapy to address the deficits listed in the problem list box on initial evaluation, provide pt/family education and to maximize pt's level of independence in the home and community environment.   Pt's spiritual, cultural and educational needs considered and pt agreeable to plan of care and goals.    Anticipated Barriers for therapy: none     Goals:   Short Term Goals: 4 weeks      1. Pt will be independent with HEP in order to demonstrate self management. Goal met  2. Pt will report a decrease in pain at its worse to 7/10 in order to improve quality of life. Not met, ongoing   3. Pt will increase lumbar flexion, extension, sidebending ROM by 10 degrees to decrease pain and increase functional mobility. Goal partially met, ongoing   4. Pt will increase BLE MMT by 1/3 in order to demonstrate increased strength. Goal partially met, ongoing   5. Pt will be able to tolerate walking several blocks with no difficulty in order to improve community mobility and participate in family activities. Goal not met   6. Pt will demonstrate appropriate body mechanics independently to prevent injury. Goal not met, ongoing      Long Term Goals: 8 weeks - ongoing      1. Pt will be  independent with updated HEP in order to demonstrate self management.   2. Pt will report a decrease in pain at its worse to 4/10 in order to improve quality of life.   3. Pt will increase lumbar ROM to WFL with decreased pain in all directions to improve gait and functional mobility.   4. Pt will increase BLE MMT to 4+/5 or greater to improve strength and endurance to perform daily activities.   5. Pt will have a FOTO limitation score of < or = to 40% to demonstrate improved functional mobility.   6. Pt will be able to perform daily activities including moderately heavy activities around the house with no difficulty and minimal modifications to increase independence.       Plan     Continue per POC for improved mobility and reduced symptoms in LB.      Plan of care Certification: 1/30/2020 to 4/30/2020.     Maria Esther Singh, PT   03/10/2020

## 2020-03-12 NOTE — PROGRESS NOTES
"  Physical Therapy Daily Treatment Note     Name: Angelique Colon  Clinic Number: [id number]    Therapy Diagnosis:   Encounter Diagnoses   Name Primary?    Chronic bilateral low back pain with bilateral sciatica     Pain in both lower extremities     Impaired functional mobility and activity tolerance      Physician: Patrick Xiong MD    Visit Date: 3/13/2020    Physician Orders: PT Eval and Treat   Medical Diagnosis from Referral:   Low back pain   M51.36 (ICD-10-CM) - DDD (degenerative disc disease), lumbar   M62.830 (ICD-10-CM) - Back spasm   Evaluation Date: 1/30/2020   PN due: 2/30/2020  Authorization Period Expiration: 3/18/2020  Plan of Care Expiration: 4/30/2020  Visit # / Visits authorized: 4/12 (8 total)      Time In: 8:58a  Time Out: 9:58a  Total Billable Time: 60 minutes     Precautions: Standard    Subjective     Pt reports: she has been given new hypertension medication and she had the staples removed. She feels "so-so" today. She has weird instances where she sits down and gets sharp pains in her thighs for a quick instance and then it goes away.   She was compliant with home exercise program.  Response to previous treatment: no change  Functional change: ongoing    Pain:  Current 6/10  Location: bilateral back , buttocks  and legs      Pts goals: be able to exercise better - feel better     Objective     Angelique received therapeutic exercises to develop strength for 55 minutes including:    NuStep 6'  Gastroc stretch [id number]   PB Rollouts (W)  1x8  Seated sciatic nerve glides 2x10   Seated long sitting hamstring stretch [id number]   Standing hamstring stretch on stairs [id number]   Doorway side gliding away from R side x10   QL stretch in doorway towards R side x5, 10 sec   Seated QL stretch over bosu ball towards B side x10, 5 sec hold   Seated glute stretch [id number]   Seated piriformis stretch [id number]   Open books x15 ea   Pelvic Tilts 1x2'  LTR stretch x5, 15-20 sec hold   SKTC x10, 10 sec hold "   Supine Hip Add Ball squeeze x2 min   Supine Hip Abd, RTB x2 min, 5 sec hold   Supine TA set with marching 2x1 min   Prone hip IR/ER AROM x20 ea   Standing marching x30    Angelique received the following manual therapy techniques: Joint mobilizations, Myofacial release and Soft tissue Mobilization were applied to the: R glute for 5 minutes, including:  Rolling pin to R glute musculature (piriformis, glute med, glute max)    Home Exercises Provided and Patient Education Provided     Education provided:   Cont to perform HEP as provided.     Written Home Exercises Provided: Patient instructed to cont prior HEP.  Exercises were reviewed and Angelique was able to demonstrate them prior to the end of the session.  Angelique demonstrated fair  understanding of the education provided.     See EMR under Patient Instructions for exercises provided prior visit.    Assessment     Pt tolerated therapy session well today. Pt continues with lateral hip shift to R with poor tolerance to a variety of exercises and stretches. She was challenged further with more stretching exercises and tolerated manual treatment well. She demonstrated soft tissue restrictions and increased muscle tension over R glute and piriformis muscles. Pt would continues to benefit from improving hip shift and working in frontal plane to improve her pain until hip shift has improved. Pt is appropriate to continue with therapy at this time.   Angelique is progressing well towards her goals.   Pt prognosis is Fair.     Pt will continue to benefit from skilled outpatient physical therapy to address the deficits listed in the problem list box on initial evaluation, provide pt/family education and to maximize pt's level of independence in the home and community environment.   Pt's spiritual, cultural and educational needs considered and pt agreeable to plan of care and goals.    Anticipated Barriers for therapy: none     Goals:   Short Term Goals: 4 weeks      1. Pt will be  independent with HEP in order to demonstrate self management. Goal met  2. Pt will report a decrease in pain at its worse to 7/10 in order to improve quality of life. Not met, ongoing   3. Pt will increase lumbar flexion, extension, sidebending ROM by 10 degrees to decrease pain and increase functional mobility. Goal partially met, ongoing   4. Pt will increase BLE MMT by 1/3 in order to demonstrate increased strength. Goal partially met, ongoing   5. Pt will be able to tolerate walking several blocks with no difficulty in order to improve community mobility and participate in family activities. Goal not met   6. Pt will demonstrate appropriate body mechanics independently to prevent injury. Goal not met, ongoing      Long Term Goals: 8 weeks - ongoing      1. Pt will be independent with updated HEP in order to demonstrate self management.   2. Pt will report a decrease in pain at its worse to 4/10 in order to improve quality of life.   3. Pt will increase lumbar ROM to WFL with decreased pain in all directions to improve gait and functional mobility.   4. Pt will increase BLE MMT to 4+/5 or greater to improve strength and endurance to perform daily activities.   5. Pt will have a FOTO limitation score of < or = to 40% to demonstrate improved functional mobility.   6. Pt will be able to perform daily activities including moderately heavy activities around the house with no difficulty and minimal modifications to increase independence.       Plan     Continue per POC for improved mobility and reduced symptoms in LB.      Plan of care Certification: 1/30/2020 to 4/30/2020.     Maria Esther Singh, PT   03/13/2020

## 2020-03-13 ENCOUNTER — CLINICAL SUPPORT (OUTPATIENT)
Dept: REHABILITATION | Facility: HOSPITAL | Age: 70
End: 2020-03-13
Payer: MEDICARE

## 2020-03-13 DIAGNOSIS — Z74.09 IMPAIRED FUNCTIONAL MOBILITY AND ACTIVITY TOLERANCE: ICD-10-CM

## 2020-03-13 DIAGNOSIS — M54.41 CHRONIC BILATERAL LOW BACK PAIN WITH BILATERAL SCIATICA: ICD-10-CM

## 2020-03-13 DIAGNOSIS — M79.605 PAIN IN BOTH LOWER EXTREMITIES: ICD-10-CM

## 2020-03-13 DIAGNOSIS — M79.604 PAIN IN BOTH LOWER EXTREMITIES: ICD-10-CM

## 2020-03-13 DIAGNOSIS — G89.29 CHRONIC BILATERAL LOW BACK PAIN WITH BILATERAL SCIATICA: ICD-10-CM

## 2020-03-13 DIAGNOSIS — M54.42 CHRONIC BILATERAL LOW BACK PAIN WITH BILATERAL SCIATICA: ICD-10-CM

## 2020-03-13 PROCEDURE — 97110 THERAPEUTIC EXERCISES: CPT | Mod: PN

## 2020-03-20 ENCOUNTER — TELEPHONE (OUTPATIENT)
Dept: REHABILITATION | Facility: HOSPITAL | Age: 70
End: 2020-03-20

## 2020-03-20 NOTE — TELEPHONE ENCOUNTER
Patient: Angelique Colon  Date: 3/20/2020  Diagnosis:   Chronic bilateral low back pain with bilateral sciatica      Pain in both lower extremities      Impaired functional mobility and activity tolerance    MRN: [id number]    Left voicemail with patient regarding COVID-19 closely and taking every precaution to ensure the safety of our patients, staff and community.  In an abundance of caution and in an effort to help reduce risk and limit community spread, we have decided to temporarily postpone appointments for patients who may be at increased risk to attend in-person therapy or where therapy is not critically needed at this time. Discussed with patient to contact PT for any questions concerning her plan of care and home exercise program.     Maria Esther Singh, PT, DPT   3/20/2020

## 2020-04-02 ENCOUNTER — TELEPHONE (OUTPATIENT)
Dept: REHABILITATION | Facility: HOSPITAL | Age: 70
End: 2020-04-02

## 2020-06-04 ENCOUNTER — LAB VISIT (OUTPATIENT)
Dept: PRIMARY CARE CLINIC | Facility: OTHER | Age: 70
End: 2020-06-04
Payer: MEDICARE

## 2020-06-04 DIAGNOSIS — R06.02 SHORTNESS OF BREATH: ICD-10-CM

## 2020-06-04 DIAGNOSIS — R50.9 FEVER: ICD-10-CM

## 2020-06-04 DIAGNOSIS — Z03.818 ENCOUNTER FOR OBSERVATION FOR SUSPECTED EXPOSURE TO OTHER BIOLOGICAL AGENTS RULED OUT: ICD-10-CM

## 2020-06-04 DIAGNOSIS — M79.10 MUSCLE PAIN: ICD-10-CM

## 2020-06-04 PROCEDURE — U0003 INFECTIOUS AGENT DETECTION BY NUCLEIC ACID (DNA OR RNA); SEVERE ACUTE RESPIRATORY SYNDROME CORONAVIRUS 2 (SARS-COV-2) (CORONAVIRUS DISEASE [COVID-19]), AMPLIFIED PROBE TECHNIQUE, MAKING USE OF HIGH THROUGHPUT TECHNOLOGIES AS DESCRIBED BY CMS-2020-01-R: HCPCS

## 2020-06-05 LAB — SARS-COV-2 RNA RESP QL NAA+PROBE: NOT DETECTED

## 2025-05-11 ENCOUNTER — HOSPITAL ENCOUNTER (EMERGENCY)
Facility: HOSPITAL | Age: 75
Discharge: HOME | End: 2025-05-11
Attending: STUDENT IN AN ORGANIZED HEALTH CARE EDUCATION/TRAINING PROGRAM
Payer: COMMERCIAL

## 2025-05-11 VITALS
BODY MASS INDEX: 30.22 KG/M2 | WEIGHT: 188 LBS | OXYGEN SATURATION: 99 % | SYSTOLIC BLOOD PRESSURE: 144 MMHG | RESPIRATION RATE: 18 BRPM | TEMPERATURE: 98 F | DIASTOLIC BLOOD PRESSURE: 72 MMHG | HEART RATE: 72 BPM | HEIGHT: 66 IN

## 2025-05-11 DIAGNOSIS — R07.89 CHEST WALL TENDERNESS: ICD-10-CM

## 2025-05-11 DIAGNOSIS — R11.2 NAUSEA AND VOMITING: ICD-10-CM

## 2025-05-11 DIAGNOSIS — M79.18 MYALGIA, MULTIPLE SITES: Primary | ICD-10-CM

## 2025-05-11 DIAGNOSIS — M47.816 ARTHRITIS OF LUMBAR SPINE: ICD-10-CM

## 2025-05-11 LAB
BILIRUB UR QL STRIP.AUTO: NEGATIVE
CLARITY UR: CLEAR
COLOR UR AUTO: YELLOW
GLUCOSE UR QL STRIP: NEGATIVE
HGB UR QL STRIP: NEGATIVE
HOLD SPECIMEN: NORMAL
KETONES UR QL STRIP: NEGATIVE
LEUKOCYTE ESTERASE UR QL STRIP: ABNORMAL
MICROSCOPIC COMMENT: NORMAL
NITRITE UR QL STRIP: NEGATIVE
PH UR STRIP: 6 [PH]
PROT UR QL STRIP: NEGATIVE
RBC #/AREA URNS AUTO: 2 /HPF (ref 0–4)
SP GR UR STRIP: 1.03
UROBILINOGEN UR STRIP-ACNC: ABNORMAL EU/DL
WBC #/AREA URNS AUTO: 3 /HPF (ref 0–5)

## 2025-05-11 PROCEDURE — 81003 URINALYSIS AUTO W/O SCOPE: CPT

## 2025-05-11 PROCEDURE — 25000003 PHARM REV CODE 250

## 2025-05-11 PROCEDURE — 93005 ELECTROCARDIOGRAM TRACING: CPT

## 2025-05-11 PROCEDURE — 99284 EMERGENCY DEPT VISIT MOD MDM: CPT | Mod: 25

## 2025-05-11 PROCEDURE — 93010 ELECTROCARDIOGRAM REPORT: CPT | Mod: ,,, | Performed by: INTERNAL MEDICINE

## 2025-05-11 PROCEDURE — 25000003 PHARM REV CODE 250: Performed by: PHYSICIAN ASSISTANT

## 2025-05-11 RX ORDER — METHOCARBAMOL 500 MG/1
1000 TABLET, FILM COATED ORAL
Status: COMPLETED | OUTPATIENT
Start: 2025-05-11 | End: 2025-05-11

## 2025-05-11 RX ORDER — IBUPROFEN 600 MG/1
600 TABLET, FILM COATED ORAL
Status: COMPLETED | OUTPATIENT
Start: 2025-05-11 | End: 2025-05-11

## 2025-05-11 RX ORDER — ALUMINUM HYDROXIDE, MAGNESIUM HYDROXIDE, AND SIMETHICONE 1200; 120; 1200 MG/30ML; MG/30ML; MG/30ML
30 SUSPENSION ORAL EVERY 6 HOURS PRN
Qty: 769 ML | Refills: 0 | Status: SHIPPED | OUTPATIENT
Start: 2025-05-11 | End: 2025-05-11

## 2025-05-11 RX ORDER — FAMOTIDINE 20 MG/1
20 TABLET, FILM COATED ORAL
Status: COMPLETED | OUTPATIENT
Start: 2025-05-11 | End: 2025-05-11

## 2025-05-11 RX ORDER — METHOCARBAMOL 500 MG/1
1000 TABLET, FILM COATED ORAL 3 TIMES DAILY
Qty: 30 TABLET | Refills: 0 | Status: SHIPPED | OUTPATIENT
Start: 2025-05-11 | End: 2025-05-11

## 2025-05-11 RX ORDER — ALUMINUM HYDROXIDE, MAGNESIUM HYDROXIDE, AND SIMETHICONE 1200; 120; 1200 MG/30ML; MG/30ML; MG/30ML
30 SUSPENSION ORAL
Status: COMPLETED | OUTPATIENT
Start: 2025-05-11 | End: 2025-05-11

## 2025-05-11 RX ORDER — METHOCARBAMOL 500 MG/1
1000 TABLET, FILM COATED ORAL 3 TIMES DAILY
Qty: 30 TABLET | Refills: 0 | Status: SHIPPED | OUTPATIENT
Start: 2025-05-11 | End: 2025-05-16

## 2025-05-11 RX ORDER — ALUMINUM HYDROXIDE, MAGNESIUM HYDROXIDE, AND SIMETHICONE 1200; 120; 1200 MG/30ML; MG/30ML; MG/30ML
30 SUSPENSION ORAL EVERY 6 HOURS PRN
Qty: 769 ML | Refills: 0 | Status: SHIPPED | OUTPATIENT
Start: 2025-05-11 | End: 2026-05-11

## 2025-05-11 RX ORDER — ACETAMINOPHEN 500 MG
500 TABLET ORAL EVERY 6 HOURS PRN
Qty: 28 TABLET | Refills: 0 | Status: SHIPPED | OUTPATIENT
Start: 2025-05-11 | End: 2025-05-18

## 2025-05-11 RX ORDER — ACETAMINOPHEN 500 MG
500 TABLET ORAL EVERY 6 HOURS PRN
Qty: 28 TABLET | Refills: 0 | Status: SHIPPED | OUTPATIENT
Start: 2025-05-11 | End: 2025-05-11

## 2025-05-11 RX ORDER — DICLOFENAC SODIUM 10 MG/G
4 GEL TOPICAL 4 TIMES DAILY PRN
Qty: 200 G | Refills: 0 | Status: SHIPPED | OUTPATIENT
Start: 2025-05-11

## 2025-05-11 RX ORDER — ACETAMINOPHEN 500 MG
1000 TABLET ORAL
Status: COMPLETED | OUTPATIENT
Start: 2025-05-11 | End: 2025-05-11

## 2025-05-11 RX ORDER — ONDANSETRON 4 MG/1
4 TABLET, ORALLY DISINTEGRATING ORAL EVERY 8 HOURS PRN
Qty: 15 TABLET | Refills: 0 | Status: SHIPPED | OUTPATIENT
Start: 2025-05-11 | End: 2025-05-11

## 2025-05-11 RX ORDER — ONDANSETRON 4 MG/1
4 TABLET, ORALLY DISINTEGRATING ORAL
Status: COMPLETED | OUTPATIENT
Start: 2025-05-11 | End: 2025-05-11

## 2025-05-11 RX ORDER — ONDANSETRON 4 MG/1
4 TABLET, ORALLY DISINTEGRATING ORAL EVERY 8 HOURS PRN
Qty: 15 TABLET | Refills: 0 | Status: SHIPPED | OUTPATIENT
Start: 2025-05-11

## 2025-05-11 RX ADMIN — ONDANSETRON 4 MG: 4 TABLET, ORALLY DISINTEGRATING ORAL at 04:05

## 2025-05-11 RX ADMIN — FAMOTIDINE 20 MG: 20 TABLET, FILM COATED ORAL at 07:05

## 2025-05-11 RX ADMIN — ALUMINUM HYDROXIDE, MAGNESIUM HYDROXIDE, AND SIMETHICONE 30 ML: 200; 200; 20 SUSPENSION ORAL at 04:05

## 2025-05-11 RX ADMIN — ACETAMINOPHEN 1000 MG: 500 TABLET ORAL at 04:05

## 2025-05-11 RX ADMIN — METHOCARBAMOL 1000 MG: 500 TABLET ORAL at 04:05

## 2025-05-11 RX ADMIN — IBUPROFEN 600 MG: 600 TABLET ORAL at 07:05

## 2025-05-11 NOTE — DISCHARGE INSTRUCTIONS

## 2025-05-11 NOTE — ED PROVIDER NOTES
Encounter Date: 5/11/2025       History     Chief Complaint   Patient presents with    Multiple complaints     Pt reported to ED with pain to the right and left arm, lateral right chest wall that radiates to her back, headache, nausea, intermittent numbness to her right proximal leg, patient denied distal region to leg or either side of whole body. Symptoms started x4 to 5 days.      Angelique Colon is a 74-year-old female with no pertinent past medical history who presents to the emergency department for evaluation of multiple medical complaints.  Over the last 4-5 days, she has had intermittent headaches, myalgias of the lower ribs bilaterally, bilateral shoulders right greater than left, right hip, and right leg, occasional cough, and nausea.  She denies fever, rhinorrhea, sore throat, ear pain, shortness of breath, dyspnea, abdominal pain, diarrhea, constipation, urinary symptoms, vaginal bleeding, or vaginal discharge.  No known sick contacts.  Over-the-counter medications with temporary and partial relief of symptoms.    The history is provided by the patient. No  was used.     Review of patient's allergies indicates:   Allergen Reactions    Lidocaine     Penicillins      History reviewed. No pertinent past medical history.  History reviewed. No pertinent surgical history.  No family history on file.  Social History[1]  Review of Systems   Constitutional:  Negative for appetite change, chills, fatigue and fever.   HENT:  Negative for congestion, rhinorrhea and sore throat.    Respiratory:  Positive for cough. Negative for shortness of breath.    Cardiovascular:  Positive for chest pain.   Gastrointestinal:  Positive for nausea. Negative for abdominal pain, constipation, diarrhea and vomiting.   Genitourinary:  Negative for decreased urine volume, dysuria, frequency, hematuria, urgency, vaginal bleeding and vaginal discharge.   Musculoskeletal:  Positive for myalgias. Negative for back pain.    Skin:  Negative for rash.   Neurological:  Positive for headaches. Negative for weakness.   Hematological:  Does not bruise/bleed easily.       Physical Exam     Initial Vitals [05/11/25 0239]   BP Pulse Resp Temp SpO2   (!) 151/67 71 18 97.6 °F (36.4 °C) 99 %      MAP       --         Physical Exam    Nursing note and vitals reviewed.  Constitutional: Vital signs are normal. She appears well-developed and well-nourished. She is cooperative. She does not appear ill. No distress.   Well-appearing.  No acute distress.  Alert and interactive.  Very pleasant.   HENT:   Head: Normocephalic and atraumatic.   Right Ear: Hearing and external ear normal.   Left Ear: Hearing and external ear normal.   Nose: Nose normal.   Eyes: Conjunctivae and EOM are normal.   Neck: Phonation normal.   Normal range of motion.  Cardiovascular:  Normal rate and regular rhythm.           No murmur heard.  Regular rate and rhythm.  No murmur or friction rub.  Extremities are warm and well perfused.  Radial pulses 2+ and symmetrical bilaterally.   Pulmonary/Chest: Effort normal. No respiratory distress.   Respirations even and unlabored.  No adventitious sounds of breathing.  Good air movement.  Some tenderness to palpation along the lower rib margins bilaterally.   Abdominal: Abdomen is soft. Bowel sounds are normal. She exhibits no distension. There is generalized abdominal tenderness.   Mild diffuse abdominal tenderness.  Bowel sounds are present.  No rebound or guarding.  No rigidity. There is no rebound and no guarding.   Musculoskeletal:      Cervical back: Normal range of motion.      Comments: Spine palpated from occiput to just above the sacrum.  No midline bony tenderness.  No bony step-offs.  Mild tenderness to palpation in the paraspinal musculature of the low back, right greater than left.  Tenderness to palpation over the greater trochanter of the right hip.  Mild tenderness to palpation in the musculature of the right thigh.   "Ambulatory with a steady gait.     Neurological: She is alert and oriented to person, place, and time. GCS eye subscore is 4. GCS verbal subscore is 5. GCS motor subscore is 6.   Motor function and sensation intact and symmetrical in bilateral upper and lower extremities.  Facial expressions are symmetrical.  No dysarthria.   Skin: Skin is warm. Capillary refill takes less than 2 seconds.         ED Course   Procedures  Labs Reviewed   URINALYSIS, REFLEX TO URINE CULTURE - Abnormal       Result Value    Color, UA Yellow      Appearance, UA Clear      pH, UA 6.0      Spec Grav UA 1.030      Protein, UA Negative      Glucose, UA Negative      Ketones, UA Negative      Bilirubin, UA Negative      Blood, UA Negative      Nitrites, UA Negative      Urobilinogen, UA 2.0-3.0 (*)     Leukocyte Esterase, UA 1+ (*)    GREY TOP URINE HOLD    Extra Tube Hold for add-ons.     URINALYSIS MICROSCOPIC    RBC, UA 2      WBC, UA 3      Microscopic Comment         EKG Readings: (Independently Interpreted)   Initial Reading: No STEMI.   Normal sinus rhythm with a ventricular rate of 67 beats per minute.  Intervals are within normal limits.   milliseconds.  Normal axis.  No ST segment elevations or depressions.  No unexpected T-wave inversions.  No STEMI.  Compared to most recent EKG dated October 26, 1992, patient's heart rate has decreased by 8 beats per minute.       Imaging Results              X-Ray Chest PA And Lateral (Final result)  Result time 05/11/25 07:52:44      Final result by Jorge Rodríguez MD (05/11/25 07:52:44)                   Impression:      No acute cardiopulmonary finding.      Electronically signed by: Jorge Rodríguez MD  Date:    05/11/2025  Time:    07:52               Narrative:    EXAMINATION:  XR CHEST PA AND LATERAL    CLINICAL HISTORY:  Provided history is "  Other chest pain".    TECHNIQUE:  Frontal and lateral views of the chest were performed.    COMPARISON:  None.    FINDINGS:  Cardiac " silhouette is not enlarged.  Probable few scattered calcified granulomata.  No focal consolidation.  No sizable pleural effusion.  No pneumothorax.  Degenerative changes in the spine.                                       X-Ray Abdomen Flat And Erect (Final result)  Result time 05/11/25 07:53:47      Final result by Jorge Rodríguez MD (05/11/25 07:53:47)                   Impression:      Moderate stool burden in the colon.      Electronically signed by: Jorge Rodríguez MD  Date:    05/11/2025  Time:    07:53               Narrative:    EXAMINATION:  XR ABDOMEN FLAT AND ERECT    CLINICAL HISTORY:  Nausea with vomiting, unspecified    TECHNIQUE:  Flat and erect frontal views of the abdomen were performed.    COMPARISON:  None    FINDINGS:  Bowel gas pattern is nonobstructive.  No evidence of pneumoperitoneum.  Moderate stool burden in the colon.  No pathologic calcifications.  Bones demonstrate age-appropriate degenerative changes.                                       X-Ray Lumbar Spine Ap And Lateral (Final result)  Result time 05/11/25 07:55:33      Final result by Jorge Rodríguez MD (05/11/25 07:55:33)                   Impression:      No acute compression fracture deformity identified in the lumbar spine.    Multilevel degenerative changes.      Electronically signed by: Jorge Rodríguez MD  Date:    05/11/2025  Time:    07:55               Narrative:    EXAMINATION:  XR LUMBAR SPINE AP AND LATERAL    CLINICAL HISTORY:  back pain;    TECHNIQUE:  AP, lateral and spot images were performed of the lumbar spine.    COMPARISON:  None.    FINDINGS:  Minimal anterolisthesis of L3 on L4.  Otherwise, grossly normal sagittal alignment.  Vertebral body heights are relatively well maintained.  Moderate degenerative changes, with disc space height loss most notable at L4-L5.  Probable multilevel facet arthropathy in the lumbar spine.  No displaced fracture identified.                                       Medications  "  acetaminophen tablet 1,000 mg (1,000 mg Oral Given 5/11/25 0417)   methocarbamoL tablet 1,000 mg (1,000 mg Oral Given 5/11/25 0417)   ondansetron disintegrating tablet 4 mg (4 mg Oral Given 5/11/25 0417)   aluminum-magnesium hydroxide-simethicone 200-200-20 mg/5 mL suspension 30 mL (30 mLs Oral Given 5/11/25 0417)   ibuprofen tablet 600 mg (600 mg Oral Given 5/11/25 0728)   famotidine tablet 20 mg (20 mg Oral Given 5/11/25 0728)     Medical Decision Making  74-year-old female presenting to the emergency department for evaluation of 4-5 days of myalgias, nausea, headache, and rib pain. No inciting incident.  No known sick contacts.  Denies fever, shortness of breath, dyspnea, leg swelling, palpitations, abdominal pain, constipation, or diarrhea.  Maintaining adequate p.o. food and fluid intake.  Over-the-counter medications with temporary relief of symptoms.  On exam, she is well-appearing and in no acute distress.  She is mildly hypertensive, all other vital signs were within normal limits.  Mild tenderness to palpation in the lower rib margins bilaterally, lumbar back right greater than left, right hip, right thigh.  The remainder of the physical exam is without concerning findings.  Further details as above.      Differential diagnosis includes but is not limited to viral syndrome, musculoskeletal pain, gastroenteritis, pneumothorax, shingles, cellulitis, ACS, PE, rheumatologic condition.    Urinalysis with 2-3 urobilinogen, 1+ leukocyte esterase.  Microscopic UA with 3 WBCs and 2 RBCs per high-powered field.  Low suspicion for urinary tract infection.  X-rays of the chest, abdomen, and lumbar back were ordered.  Patient was given Tylenol, Maalox, Robaxin, and Zofran in the emergency department with good relief of symptoms.    Care signed out to Trang Rao PA-C at the end of my shift pending x-ray results.  Trang Rao PA-C dictating  0715- pt still having "stomach and back pain" but other pains have " resolved  Ibuprofen and pepcid ordered     Discussed findings of xray imaging with pt. reviewed x-ray of the abdomen showing constipation.  Instructed to increase fiber in diet and water intake.  Additionally x-ray of the lumbar spine showing arthritic changes.  She denies bowel or bladder incontinence saddle anesthesia.  Chest x-ray unremarkable.  Will discharge with medications for symptomatic treatment.  Will have her follow up with GI for her epigastric pain.  Had EGD and colonoscopy that were unremarkable 2 years ago for similar abdominal pain.  Additionally referral to pain management placed.  Will have patient return ER for worsening or as needed. PCP follow up in 1-2 days.     Amount and/or Complexity of Data Reviewed  Radiology: ordered.    Risk  OTC drugs.  Prescription drug management.                                      Clinical Impression:  Final diagnoses:  [R07.89] Chest wall tenderness  [R11.2] Nausea and vomiting  [M79.18] Myalgia, multiple sites (Primary)  [M47.816] Arthritis of lumbar spine          ED Disposition Condition    Discharge Stable          ED Prescriptions       Medication Sig Dispense Start Date End Date Auth. Provider    ondansetron (ZOFRAN-ODT) 4 MG TbDL  (Status: Discontinued) Take 1 tablet (4 mg total) by mouth every 8 (eight) hours as needed. 15 tablet 5/11/2025 5/11/2025 Trang Rao PA-C    aluminum-magnesium hydroxide-simethicone (MAALOX ADVANCED) 200-200-20 mg/5 mL Susp  (Status: Discontinued) Take 30 mLs by mouth every 6 (six) hours as needed. 769 mL 5/11/2025 5/11/2025 Trang Rao PA-C    acetaminophen (TYLENOL) 500 MG tablet  (Status: Discontinued) Take 1 tablet (500 mg total) by mouth every 6 (six) hours as needed. 28 tablet 5/11/2025 5/11/2025 Trang Rao PA-C    methocarbamoL (ROBAXIN) 500 MG Tab  (Status: Discontinued) Take 2 tablets (1,000 mg total) by mouth 3 (three) times daily. for 5 days 30 tablet 5/11/2025 5/11/2025 Jalen  Trang MCFARLAND PA-C    diclofenac sodium (VOLTAREN) 1 % Gel Apply 4 g topically 4 (four) times daily as needed (for pain). 200 g 5/11/2025 -- Trang Rao PA-C    acetaminophen (TYLENOL) 500 MG tablet Take 1 tablet (500 mg total) by mouth every 6 (six) hours as needed for Pain. 28 tablet 5/11/2025 5/18/2025 Trang Rao PA-C    aluminum-magnesium hydroxide-simethicone (MAALOX ADVANCED) 200-200-20 mg/5 mL Susp Take 30 mLs by mouth every 6 (six) hours as needed. 769 mL 5/11/2025 5/11/2026 Trang Rao PA-C    methocarbamoL (ROBAXIN) 500 MG Tab Take 2 tablets (1,000 mg total) by mouth 3 (three) times daily. for 5 days 30 tablet 5/11/2025 5/16/2025 Trang Rao PA-C    ondansetron (ZOFRAN-ODT) 4 MG TbDL Take 1 tablet (4 mg total) by mouth every 8 (eight) hours as needed. 15 tablet 5/11/2025 -- Trang Rao PA-C          Follow-up Information       Follow up With Specialties Details Why Contact Info    Christine Peterson MD General Practice, Internal Medicine Schedule an appointment as soon as possible for a visit in 2 days  [address] [place] [address]  [address]  [facility] [address]  [phone]      [facility] - Emergency Dept Emergency Medicine Go to  As needed, If symptoms worsen [address] [facility] [place] [address] [phone]    [facility] GASTROENTEROLOGY Gastroenterology   [address] [facility] [place] [address] [phone]    [facility] PAIN MANAGEMENT Pain Medicine   [address] [facility] [place] [address] [phone]                 [1]         Trang Rao PA-C  05/11/25 0954

## 2025-05-12 LAB
OHS QRS DURATION: 84 MS
OHS QTC CALCULATION: 441 MS